# Patient Record
Sex: MALE | Race: AMERICAN INDIAN OR ALASKA NATIVE | Employment: OTHER | URBAN - METROPOLITAN AREA
[De-identification: names, ages, dates, MRNs, and addresses within clinical notes are randomized per-mention and may not be internally consistent; named-entity substitution may affect disease eponyms.]

---

## 2021-11-24 ENCOUNTER — HOSPITAL ENCOUNTER (EMERGENCY)
Facility: CLINIC | Age: 61
Discharge: HOME | End: 2021-11-24
Attending: EMERGENCY MEDICINE
Payer: COMMERCIAL

## 2021-11-24 VITALS
RESPIRATION RATE: 17 BRPM | OXYGEN SATURATION: 96 % | TEMPERATURE: 97.8 F | SYSTOLIC BLOOD PRESSURE: 109 MMHG | HEART RATE: 86 BPM | DIASTOLIC BLOOD PRESSURE: 72 MMHG

## 2021-11-24 DIAGNOSIS — L03.113 CELLULITIS OF RIGHT UPPER EXTREMITY: Primary | ICD-10-CM

## 2021-11-24 PROCEDURE — 99283 EMERGENCY DEPT VISIT LOW MDM: CPT

## 2021-11-24 PROCEDURE — 6370000000 HC RX 637 (ALT 250 FOR IP): Performed by: EMERGENCY MEDICINE

## 2021-11-24 RX ORDER — DOXYCYCLINE HYCLATE 100 MG
100 TABLET ORAL ONCE
Status: DISCONTINUED | OUTPATIENT
Start: 2021-11-24 | End: 2021-11-24 | Stop reason: CLARIF

## 2021-11-24 RX ORDER — DOXYCYCLINE 100 MG/1
100 CAPSULE ORAL ONCE
Status: COMPLETED | OUTPATIENT
Start: 2021-11-24 | End: 2021-11-24

## 2021-11-24 RX ORDER — DOXYCYCLINE HYCLATE 100 MG
100 TABLET ORAL 2 TIMES DAILY
Qty: 16 TABLET | Refills: 0 | Status: SHIPPED | OUTPATIENT
Start: 2021-11-24 | End: 2021-12-02

## 2021-11-24 RX ADMIN — DOXYCYCLINE 100 MG: 100 CAPSULE ORAL at 21:19

## 2021-11-24 ASSESSMENT — ENCOUNTER SYMPTOMS
SHORTNESS OF BREATH: 0
NAUSEA: 0
VOMITING: 0
ABDOMINAL PAIN: 0
DIARRHEA: 0
SORE THROAT: 0

## 2021-11-24 ASSESSMENT — PAIN SCALES - GENERAL: PAINLEVEL_OUTOF10: 2

## 2021-11-25 NOTE — ED PROVIDER NOTES
[facility]  [address] [facility]  Phone: [phone]    EMERGENCY DEPARTMENT ENCOUNTER          Pt Name: Nicolette Hinds  MRN: [id number]  [place] 1960  Date of evaluation: 11/24/2021      CHIEF COMPLAINT       Chief Complaint   Patient presents with    Skin Problem     possible infection at tattoo        [address] [facility]       Nicolette Hinds is a 63 y.o. male who presents with what appears to be cellulitis. About a week ago patient got a new tattoo to his right anterior mid humerus region of his arm. Reports he started noticing the possible infection yesterday. He does report some pain distally however denies other symptoms or concerns. No fevers. No injury. Denies other concerns. REVIEW OF SYSTEMS       Review of Systems   Constitutional: Negative for chills and fever. HENT: Negative for sore throat. Respiratory: Negative for shortness of breath. Cardiovascular: Negative for chest pain. Gastrointestinal: Negative for abdominal pain, diarrhea, nausea and vomiting. Musculoskeletal: Negative for neck pain. Skin: Positive for rash. Neurological: Negative for weakness and numbness. PAST MEDICAL HISTORY    has no past medical history on file. SURGICAL HISTORY      has no past surgical history on file. CURRENT MEDICATIONS       Previous Medications    No medications on file       ALLERGIES     is allergic to codeine. FAMILY HISTORY     has no family status information on file. family history is not on file. SOCIAL HISTORY      reports that he has never smoked. He has never used smokeless tobacco. He reports previous alcohol use. PHYSICAL EXAM     INITIAL VITALS:  oral temperature is 97.8 °F (36.6 °C). His blood pressure is 109/72 and his pulse is 86. His respiration is 17 and oxygen saturation is 96%. Physical Exam  Constitutional:       General: He is not in acute distress. Appearance: He is well-developed. HENT:      Head: Normocephalic and atraumatic. Right Ear: External ear normal.      Left Ear: External ear normal.   Eyes:      General: Lids are normal.         Right eye: No discharge. Left eye: No discharge. Neck:      Trachea: No tracheal deviation. Cardiovascular:      Rate and Rhythm: Normal rate and regular rhythm. Pulmonary:      Effort: Pulmonary effort is normal.      Breath sounds: Normal breath sounds. Abdominal:      Palpations: Abdomen is soft. Tenderness: There is no abdominal tenderness. Musculoskeletal:        Arms:       Comments: Patient has what appears to be a tattoo of a turtle to the area shown on the picture there are areas within the tattoo that appears cellulitic with some crusting. No tenderness, areas of fluctuance or crepitus. Normal distal pulses, motor and sensation. Motor function normal.  No discomfort with motor function to the tattoo. Otherwise unremarkable arm exam.  No significant edema. No ecchymosis. No evidence of necrotizing fasciitis clinically. Skin:     General: Skin is warm and dry. Neurological:      Mental Status: He is alert. GCS: GCS eye subscore is 4. GCS verbal subscore is 5. GCS motor subscore is 6. Psychiatric:         Behavior: Behavior normal.       DIFFERENTIAL DIAGNOSIS/ MDM:     Plan to be to put the patient on doxycycline. Advised to follow-up with his PCP return right away if worsening or for new or concerning symptoms. I do not feel further work-up is necessary. He is comfortable with this plan and knows to return sooner if worsening. There are no signs of compartment syndrome. The pulses are 2/4. The motor is 5/5. The sensation is intact. The patient has full range of motion. The patient was instructed to follow up in 2-3 days with their family doctor for a wound check. We also discussed returning to the Emergency Department immediately if new or worsening symptoms occur.   We have discussed the symptoms which are most concerning (e.g., increasing pain, fever, drainage from the wound or other signs of infection, numbness, weakness, color change or coldness to the extremity) that necessitate immediate return. The patient was advised to keep the wound clean and dry, they may apply antibiotic ointment to the wound. The patient understands that at this time there is no evidence for a more malignant underlying process, but the patient also understands that early in the process of an illness or injury, an emergency department workup can be falsely reassuring. Routine discharge counseling was given, and the patient understands that worsening, changing or persistent symptoms should prompt an immediate call or follow up with their primary physician or return to the emergency department. The importance of appropriate follow up was also discussed. I have reviewed the disposition diagnosis with the patient and or their family/guardian. I have answered their questions and given discharge instructions. They voiced understanding of these instructions and did not have any further questions or complaints. DIAGNOSTIC RESULTS     EKG: All EKG's are interpreted by the Emergency Department Physician who either signs or Co-signs this chart in the absence of a cardiologist.        RADIOLOGY:   Interpretation per the Radiologist below, if available at the time of this note:  No orders to display       No results found. LABS:  No results found for this visit on 11/24/21.     EMERGENCY DEPARTMENT COURSE:     The patient was given the following medications:  Orders Placed This Encounter   Medications    DISCONTD: doxycycline hyclate (VIBRA-TABS) tablet 100 mg     Order Specific Question:   Antimicrobial Indications     Answer:   Skin and Soft Tissue Infection    doxycycline hyclate (VIBRA-TABS) 100 MG tablet     Sig: Take 1 tablet by mouth 2 times daily for 8 days     Dispense:  16 tablet     Refill:  0    doxycycline monohydrate (MONODOX) capsule 100 mg        Vitals:    Vitals:    11/24/21 2050   BP: 109/72   Pulse: 86   Resp: 17   Temp: 97.8 °F (36.6 °C)   TempSrc: Oral   SpO2: 96%     -------------------------  BP: 109/72, Temp: 97.8 °F (36.6 °C), Pulse: 86, Resp: 17      Re-evaluation Notes    CONSULTS:    None    CRITICAL CARE:     None    PROCEDURES:    None    FINAL IMPRESSION      1.  Cellulitis of right upper extremity          DISPOSITION/PLAN   DISPOSITION Decision To Discharge 11/24/2021 09:06:53 PM      Condition on Disposition    Improved    PATIENT REFERRED TO:  Your doctor    Schedule an appointment as soon as possible for a visit in 3 days        DISCHARGE MEDICATIONS:  New Prescriptions    DOXYCYCLINE HYCLATE (VIBRA-TABS) 100 MG TABLET    Take 1 tablet by mouth 2 times daily for 8 days       (Please note that portions of this note were completed with a voice recognition program.  Efforts were made to edit the dictations but occasionally words are mis-transcribed.)    Yadiel Giron DO, DO  Attending Emergency Physician       Yadiel Giron DO  11/24/21 1834